# Patient Record
Sex: MALE | Employment: FULL TIME | URBAN - METROPOLITAN AREA
[De-identification: names, ages, dates, MRNs, and addresses within clinical notes are randomized per-mention and may not be internally consistent; named-entity substitution may affect disease eponyms.]

---

## 2017-09-06 ENCOUNTER — APPOINTMENT (OUTPATIENT)
Dept: LAB | Facility: HOSPITAL | Age: 57
End: 2017-09-06
Attending: UROLOGY
Payer: COMMERCIAL

## 2017-09-06 ENCOUNTER — OFFICE VISIT (OUTPATIENT)
Dept: SURGERY | Facility: CLINIC | Age: 57
End: 2017-09-06

## 2017-09-06 VITALS
BODY MASS INDEX: 24.34 KG/M2 | RESPIRATION RATE: 16 BRPM | TEMPERATURE: 98 F | WEIGHT: 170 LBS | SYSTOLIC BLOOD PRESSURE: 116 MMHG | DIASTOLIC BLOOD PRESSURE: 70 MMHG | HEART RATE: 70 BPM | HEIGHT: 70 IN

## 2017-09-06 DIAGNOSIS — N52.01 ERECTILE DYSFUNCTION DUE TO ARTERIAL INSUFFICIENCY: ICD-10-CM

## 2017-09-06 DIAGNOSIS — N48.89 PENILE PAIN: ICD-10-CM

## 2017-09-06 DIAGNOSIS — Z12.5 PROSTATE CANCER SCREENING: ICD-10-CM

## 2017-09-06 DIAGNOSIS — N40.1 BENIGN PROSTATIC HYPERPLASIA WITH URINARY FREQUENCY: Primary | ICD-10-CM

## 2017-09-06 DIAGNOSIS — R35.0 BENIGN PROSTATIC HYPERPLASIA WITH URINARY FREQUENCY: Primary | ICD-10-CM

## 2017-09-06 DIAGNOSIS — F52.4 PREMATURE EJACULATION: ICD-10-CM

## 2017-09-06 DIAGNOSIS — R35.1 NOCTURIA: ICD-10-CM

## 2017-09-06 LAB
BILIRUB UR QL: NEGATIVE
CLARITY UR: CLEAR
COLOR UR: YELLOW
GLUCOSE UR-MCNC: NEGATIVE MG/DL
HGB UR QL STRIP.AUTO: NEGATIVE
KETONES UR-MCNC: NEGATIVE MG/DL
LEUKOCYTE ESTERASE UR QL STRIP.AUTO: NEGATIVE
NITRITE UR QL STRIP.AUTO: NEGATIVE
PH UR: 5 [PH] (ref 5–8)
PROT UR-MCNC: NEGATIVE MG/DL
PSA SERPL-MCNC: 2.4 NG/ML (ref 0–4)
SP GR UR STRIP: 1.02 (ref 1–1.03)
UROBILINOGEN UR STRIP-ACNC: <2
VIT C UR-MCNC: NEGATIVE MG/DL

## 2017-09-06 PROCEDURE — 81003 URINALYSIS AUTO W/O SCOPE: CPT

## 2017-09-06 PROCEDURE — 99212 OFFICE O/P EST SF 10 MIN: CPT | Performed by: UROLOGY

## 2017-09-06 PROCEDURE — 99215 OFFICE O/P EST HI 40 MIN: CPT | Performed by: UROLOGY

## 2017-09-06 PROCEDURE — 36415 COLL VENOUS BLD VENIPUNCTURE: CPT

## 2017-09-06 RX ORDER — SIMVASTATIN 20 MG
TABLET ORAL
Refills: 1 | COMMUNITY
Start: 2017-07-09 | End: 2018-11-19 | Stop reason: ALTCHOICE

## 2017-09-06 NOTE — PROGRESS NOTES
HPI:    Patient ID: Hannah Barroso is a 65year old male.     HPI    Voiding Dysfunction  Patient has current AUA score of 7, mild voiding dysfunction category, improved compared to previous score of 15, moderate voiding dysfunction category, on 05/19/17  per  for dysuria, flank pain and hematuria (Gross). Neurological: Negative for speech difficulty. Psychiatric/Behavioral: The patient is not nervous/anxious. HISTORY:  History reviewed. No pertinent past medical history. History reviewed.  No pertin (primary encounter diagnosis)  On STAS, Prostate: 0-1+ enlarged, 25g, no palpable nodules or indurations.  Discussed starting long term daily avodart or finasteride to inhibit BPH growth with patient vs observation; patient chooses to continue observation du taking a Motrin or Advil or ibuprofen or Tylenol an hour before intercourse--may decrease the discomfort; presently states the discomfort is very mild    3. Today blood draw for PSA and also complete urinalysis    4.   If above tests come back normal, retu

## 2017-09-06 NOTE — PATIENT INSTRUCTIONS
1.  Concerning treatment of the premature ejaculation, since you do not want to take prescription antidepressant medications such as Paxil, you may consider using a condom during intercourse--may decrease sensation may delay premature ejaculation    2.

## 2018-02-19 ENCOUNTER — APPOINTMENT (OUTPATIENT)
Dept: OTHER | Facility: HOSPITAL | Age: 58
End: 2018-02-19
Attending: EMERGENCY MEDICINE

## 2018-10-30 ENCOUNTER — OFFICE VISIT (OUTPATIENT)
Dept: INTERNAL MEDICINE CLINIC | Facility: CLINIC | Age: 58
End: 2018-10-30
Payer: COMMERCIAL

## 2018-10-30 VITALS
HEART RATE: 67 BPM | WEIGHT: 174 LBS | HEIGHT: 70 IN | BODY MASS INDEX: 24.91 KG/M2 | SYSTOLIC BLOOD PRESSURE: 112 MMHG | DIASTOLIC BLOOD PRESSURE: 64 MMHG | OXYGEN SATURATION: 98 % | RESPIRATION RATE: 15 BRPM

## 2018-10-30 DIAGNOSIS — N40.1 BENIGN PROSTATIC HYPERPLASIA WITH URINARY FREQUENCY: ICD-10-CM

## 2018-10-30 DIAGNOSIS — R35.0 BENIGN PROSTATIC HYPERPLASIA WITH URINARY FREQUENCY: ICD-10-CM

## 2018-10-30 DIAGNOSIS — Z00.00 ANNUAL PHYSICAL EXAM: Primary | ICD-10-CM

## 2018-10-30 DIAGNOSIS — Z86.39 HISTORY OF MIXED HYPERLIPIDEMIA: ICD-10-CM

## 2018-10-30 DIAGNOSIS — Z12.5 SCREENING FOR PROSTATE CANCER: ICD-10-CM

## 2018-10-30 DIAGNOSIS — R14.0 ABDOMINAL BLOATING: ICD-10-CM

## 2018-10-30 PROCEDURE — 99386 PREV VISIT NEW AGE 40-64: CPT | Performed by: FAMILY MEDICINE

## 2018-10-30 RX ORDER — METOCLOPRAMIDE 10 MG/1
10 TABLET ORAL 2 TIMES DAILY PRN
Qty: 60 TABLET | Refills: 1 | Status: SHIPPED | OUTPATIENT
Start: 2018-10-30 | End: 2019-09-07 | Stop reason: ALTCHOICE

## 2018-10-31 NOTE — PROGRESS NOTES
Karen Chiu is a 63year old male who presents for a complete physical exam.   HPI:     Concerns:  Mid abdominal bloating x weeks    Last colonoscopy:  2017--normal per patient  Last PSA: 2017--normal    Wt Readings from Last 6 Encounters:  10/30/18 : 174 auscultation  CARDIO: RRR without murmur  GI: good BS's,no masses, HSM or tenderness  EXTREMITIES: no cyanosis, clubbing or edema  NEURO: Oriented times three,cranial nerves are intact,motor and sensory are grossly intact    ASSESSMENT AND PLAN:   Donato Molina

## 2018-11-17 ENCOUNTER — NURSE ONLY (OUTPATIENT)
Dept: INTERNAL MEDICINE CLINIC | Facility: CLINIC | Age: 58
End: 2018-11-17
Payer: COMMERCIAL

## 2018-11-17 DIAGNOSIS — Z86.39 HISTORY OF MIXED HYPERLIPIDEMIA: ICD-10-CM

## 2018-11-17 DIAGNOSIS — Z00.00 ANNUAL PHYSICAL EXAM: ICD-10-CM

## 2018-11-17 DIAGNOSIS — Z12.5 SCREENING FOR PROSTATE CANCER: ICD-10-CM

## 2018-11-17 PROCEDURE — 80053 COMPREHEN METABOLIC PANEL: CPT | Performed by: FAMILY MEDICINE

## 2018-11-17 PROCEDURE — 36415 COLL VENOUS BLD VENIPUNCTURE: CPT | Performed by: FAMILY MEDICINE

## 2018-11-17 PROCEDURE — 80061 LIPID PANEL: CPT | Performed by: FAMILY MEDICINE

## 2018-11-19 PROBLEM — E78.2 MIXED HYPERLIPIDEMIA: Status: ACTIVE | Noted: 2018-11-19

## 2019-09-07 ENCOUNTER — TELEPHONE (OUTPATIENT)
Dept: INTERNAL MEDICINE CLINIC | Facility: CLINIC | Age: 59
End: 2019-09-07

## 2019-09-07 ENCOUNTER — OFFICE VISIT (OUTPATIENT)
Dept: INTERNAL MEDICINE CLINIC | Facility: CLINIC | Age: 59
End: 2019-09-07
Payer: COMMERCIAL

## 2019-09-07 VITALS
OXYGEN SATURATION: 98 % | DIASTOLIC BLOOD PRESSURE: 62 MMHG | HEART RATE: 63 BPM | TEMPERATURE: 98 F | BODY MASS INDEX: 24.62 KG/M2 | RESPIRATION RATE: 16 BRPM | SYSTOLIC BLOOD PRESSURE: 114 MMHG | WEIGHT: 172 LBS | HEIGHT: 70 IN

## 2019-09-07 DIAGNOSIS — E78.2 MIXED HYPERLIPIDEMIA: ICD-10-CM

## 2019-09-07 DIAGNOSIS — J06.9 VIRAL UPPER RESPIRATORY TRACT INFECTION: Primary | ICD-10-CM

## 2019-09-07 DIAGNOSIS — K21.9 GASTROESOPHAGEAL REFLUX DISEASE, ESOPHAGITIS PRESENCE NOT SPECIFIED: ICD-10-CM

## 2019-09-07 DIAGNOSIS — J98.01 POST-INFECTION BRONCHOSPASM: ICD-10-CM

## 2019-09-07 PROCEDURE — 99213 OFFICE O/P EST LOW 20 MIN: CPT | Performed by: FAMILY MEDICINE

## 2019-09-07 RX ORDER — PANTOPRAZOLE SODIUM 40 MG/1
40 TABLET, DELAYED RELEASE ORAL DAILY PRN
Qty: 90 TABLET | Refills: 0 | Status: SHIPPED | OUTPATIENT
Start: 2019-09-07 | End: 2019-11-29

## 2019-09-07 RX ORDER — ALBUTEROL SULFATE 90 UG/1
2 AEROSOL, METERED RESPIRATORY (INHALATION) EVERY 4 HOURS PRN
Qty: 1 INHALER | Refills: 1 | Status: SHIPPED | OUTPATIENT
Start: 2019-09-07

## 2019-09-07 RX ORDER — ATORVASTATIN CALCIUM 20 MG/1
20 TABLET, FILM COATED ORAL NIGHTLY
Qty: 90 TABLET | Refills: 3 | Status: SHIPPED | OUTPATIENT
Start: 2019-09-07 | End: 2020-05-05

## 2019-09-07 NOTE — TELEPHONE ENCOUNTER
Faxed over release of medical records form to Yon Ramirez at fax # [phone]. Requesting Colonoscopy report.

## 2019-09-07 NOTE — PROGRESS NOTES
CC:  URI (Pt presents to clinic for c/o URI, cough/cold x 3 days. Denies fevers. Sore throat resolved. )      Hx of CC:  IMPROVED RHINITIS/COLD BUT RESIDUAL COUGH WHICH IS WORSE AT NIGHT.   FASTING FOR RECHECK LIPIDS  EPISODIC GERD    Vitals:    09/07/19  1

## 2019-09-08 LAB
ALBUMIN/GLOBULIN RATIO: 1.3 (CALC) (ref 1–2.5)
ALBUMIN: 4.3 G/DL (ref 3.6–5.1)
ALKALINE PHOSPHATASE: 58 U/L (ref 40–115)
ALT: 33 U/L (ref 9–46)
AST: 28 U/L (ref 10–35)
BILIRUBIN, TOTAL: 0.5 MG/DL (ref 0.2–1.2)
BUN: 11 MG/DL (ref 7–25)
CALCIUM: 9.2 MG/DL (ref 8.6–10.3)
CARBON DIOXIDE: 26 MMOL/L (ref 20–32)
CHLORIDE: 106 MMOL/L (ref 98–110)
CHOL/HDLC RATIO: 3.7 (CALC)
CHOLESTEROL, TOTAL: 165 MG/DL
CREATININE: 1.01 MG/DL (ref 0.7–1.33)
EGFR IF AFRICN AM: 95 ML/MIN/1.73M2
EGFR IF NONAFRICN AM: 82 ML/MIN/1.73M2
GLOBULIN: 3.2 G/DL (CALC) (ref 1.9–3.7)
GLUCOSE: 101 MG/DL (ref 65–99)
HDL CHOLESTEROL: 45 MG/DL
LDL-CHOLESTEROL: 102 MG/DL (CALC)
NON-HDL CHOLESTEROL: 120 MG/DL (CALC)
POTASSIUM: 4.2 MMOL/L (ref 3.5–5.3)
PROTEIN, TOTAL: 7.5 G/DL (ref 6.1–8.1)
SODIUM: 139 MMOL/L (ref 135–146)
TRIGLYCERIDES: 88 MG/DL

## 2019-09-16 NOTE — TELEPHONE ENCOUNTER
Received colonoscopy report from [address] [place] [address]'s office via fax. Placed in MD's bin for review.

## 2019-11-29 ENCOUNTER — HOSPITAL ENCOUNTER (OUTPATIENT)
Dept: GENERAL RADIOLOGY | Age: 59
Discharge: HOME | End: 2019-11-29
Attending: FAMILY MEDICINE
Payer: COMMERCIAL

## 2019-11-29 ENCOUNTER — OFFICE VISIT (OUTPATIENT)
Dept: INTERNAL MEDICINE CLINIC | Facility: CLINIC | Age: 59
End: 2019-11-29
Payer: COMMERCIAL

## 2019-11-29 VITALS
BODY MASS INDEX: 24.77 KG/M2 | SYSTOLIC BLOOD PRESSURE: 112 MMHG | WEIGHT: 173 LBS | HEIGHT: 70 IN | OXYGEN SATURATION: 98 % | HEART RATE: 67 BPM | DIASTOLIC BLOOD PRESSURE: 78 MMHG

## 2019-11-29 DIAGNOSIS — R06.83 LOUD SNORING: ICD-10-CM

## 2019-11-29 DIAGNOSIS — K21.9 GASTROESOPHAGEAL REFLUX DISEASE, ESOPHAGITIS PRESENCE NOT SPECIFIED: ICD-10-CM

## 2019-11-29 DIAGNOSIS — R04.2 SPITTING UP BLOOD: Primary | ICD-10-CM

## 2019-11-29 DIAGNOSIS — R04.2 SPITTING UP BLOOD: ICD-10-CM

## 2019-11-29 DIAGNOSIS — G47.19 EXCESSIVE DAYTIME SLEEPINESS: ICD-10-CM

## 2019-11-29 DIAGNOSIS — R51.9 MORNING HEADACHE: ICD-10-CM

## 2019-11-29 PROCEDURE — 71046 X-RAY EXAM CHEST 2 VIEWS: CPT | Performed by: FAMILY MEDICINE

## 2019-11-29 PROCEDURE — 99214 OFFICE O/P EST MOD 30 MIN: CPT | Performed by: FAMILY MEDICINE

## 2019-11-29 RX ORDER — PANTOPRAZOLE SODIUM 40 MG/1
40 TABLET, DELAYED RELEASE ORAL DAILY PRN
Qty: 90 TABLET | Refills: 0 | Status: SHIPPED | OUTPATIENT
Start: 2019-11-29

## 2019-11-29 NOTE — PATIENT INSTRUCTIONS
¿Qué son los ronquidos y la apnea del sueño? Si ha tenido alguna vez la nariz congestionada, usted conoce deondre la sensación de tratar de respirar a través de vías respiratorias estrechadas u obstruidas.  [place] es lo que le ocurre a bright garganta cuando ronc Si las estructuras de la garganta se bloquean completamente, el aire no puede llegar a los pulmones. Susana trastorno se llama apnea (que significa “ausencia de respiración”).  Iftikhar Arthur no reciben suficiente aire, el cerebro ordena al cuerpo que desp

## 2019-11-29 NOTE — PROGRESS NOTES
CC:  Other (Pt states he was spits up blood in the AM. Denies coughing.)      Hx of CC:  FOR ABOUT A MONTH, HAS FREQUENT MORNING BLOODY SPIT UP.  DENIES COUGH OR FEVER. H/O GERD.     ACCOMPANIED BY WIFE WHO STATES PATIENT HAS LOUD SNORING AND SOMETIMES DIAZ SLEEP APNEA TEST  - GENERAL SLEEP STUDY TRANSCRIPTION; Future    5.  Gastroesophageal reflux disease, esophagitis presence not specified  RESUME PPI QD X 1 MONTH, THEN PRN    OP [facility] ALT REFERRAL HOME SLEEP APNEA TEST  XR CHEST PA + LAT CHEST (CPT=71046)  No

## 2019-12-03 ENCOUNTER — TELEPHONE (OUTPATIENT)
Dept: INTERNAL MEDICINE CLINIC | Facility: CLINIC | Age: 59
End: 2019-12-03

## 2019-12-03 NOTE — TELEPHONE ENCOUNTER
SLEEP STUDY:    This referral has been approved. Dave Chambers speaking with the patients insurance it has been determined that  [phone] (Unattended Home Sleep Study) does not require prior authorization.      The reference# for that call is (SPENSER BERGERON 12/3/19 @

## 2019-12-04 NOTE — TELEPHONE ENCOUNTER
Patient working; woman answering needs Russian speaking to explain referral AND WHAT APPROVED FOR. Also to call [phone] for appointment.

## 2020-02-13 NOTE — PATIENT INSTRUCTIONS
Los trastornos de ansiedad    Lillian todo el arnaldo siente nerviosismo de vez en cuando. Es normal tener un nudo en el estómago antes de un examen, o el pulso acelerado la primera vez que guerda sale con alguien.  Dana un trastorno de ansiedad es [employer] Trastornos de ansiedad frecuentes  · Trastorno de pánico: Causa un miedo intenso de encontrarse en peligro. · Fobias: Miedos extremos a ciertos objetos, lugares o eventos.   · Trastorno obsesivo-compulsivo: Causa pensamientos indeseables y, a veces, [employer] Los trastornos de adaptación a veces se presentan cuando la geri se complica demasiado. Suelen aparecer [employer] 3 meses siguientes al comienzo de bianca época estresante.  Los síntomas varían enormemente, desde actuar esvin si el evento estresante no Davina

## 2020-02-14 NOTE — PROGRESS NOTES
CC:  Anxiety (Pt presents to clinic for c/o increased anxiety and stress x 2 wks. Having trouble sleeping.)      Hx of CC:  LOST JOB 2 WEEKS AGO AND C/O ANXIETY, SADNESS AND INSOMNIA SINCE THEN.     Vitals:    02/13/20  1029   BP: 116/72   Pulse: 77   Resp:

## 2020-05-05 DIAGNOSIS — E78.2 MIXED HYPERLIPIDEMIA: ICD-10-CM

## 2020-05-05 RX ORDER — ATORVASTATIN CALCIUM 20 MG/1
20 TABLET, FILM COATED ORAL NIGHTLY
Qty: 90 TABLET | Refills: 3 | Status: SHIPPED | OUTPATIENT
Start: 2020-05-05